# Patient Record
Sex: MALE | Race: WHITE | URBAN - METROPOLITAN AREA
[De-identification: names, ages, dates, MRNs, and addresses within clinical notes are randomized per-mention and may not be internally consistent; named-entity substitution may affect disease eponyms.]

---

## 2019-12-21 ENCOUNTER — HISTORICAL (OUTPATIENT)
Dept: ADMINISTRATIVE | Facility: HOSPITAL | Age: 24
End: 2019-12-21

## 2019-12-21 LAB
ABS NEUT (OLG): 15.42 X10(3)/MCL (ref 2.1–9.2)
ALBUMIN SERPL-MCNC: 3.1 GM/DL (ref 3.4–5)
ALBUMIN/GLOB SERPL: 0.9 {RATIO}
ALP SERPL-CCNC: 83 UNIT/L (ref 50–136)
ALT SERPL-CCNC: 114 UNIT/L (ref 12–78)
AMPHET UR QL SCN: POSITIVE
AMYLASE SERPL-CCNC: 67 UNIT/L (ref 25–115)
ANION GAP SERPL CALC-SCNC: 15 MMOL/L
APPEARANCE, UA: CLEAR
APTT PPP: 35.3 SECOND(S) (ref 24.2–33.9)
AST SERPL-CCNC: 150 UNIT/L (ref 15–37)
BACTERIA SPEC CULT: ABNORMAL /HPF
BARBITURATE SCN PRESENT UR: NEGATIVE
BASOPHILS # BLD AUTO: 0.1 X10(3)/MCL (ref 0–0.2)
BASOPHILS NFR BLD AUTO: 0 %
BENZODIAZ UR QL SCN: NEGATIVE
BILIRUB SERPL-MCNC: 0.3 MG/DL (ref 0.2–1)
BILIRUB UR QL STRIP: NEGATIVE
BILIRUBIN DIRECT+TOT PNL SERPL-MCNC: 0.1 MG/DL (ref 0–0.2)
BILIRUBIN DIRECT+TOT PNL SERPL-MCNC: 0.2 MG/DL (ref 0–0.8)
BUN SERPL-MCNC: 17 MG/DL (ref 7–18)
BUN SERPL-MCNC: 17 MG/DL (ref 8–26)
CALCIUM SERPL-MCNC: 7.6 MG/DL (ref 8.5–10.1)
CANNABINOIDS UR QL SCN: NEGATIVE
CHLORIDE SERPL-SCNC: 105 MMOL/L (ref 98–109)
CHLORIDE SERPL-SCNC: 108 MMOL/L (ref 98–107)
CO2 SERPL-SCNC: 22 MMOL/L (ref 21–32)
COCAINE UR QL SCN: NEGATIVE
COLOR UR: YELLOW
CREAT SERPL-MCNC: 1.21 MG/DL (ref 0.7–1.3)
CREAT SERPL-MCNC: 1.3 MG/DL (ref 0.6–1.3)
CROSSMATCH INTERPRETATION: NORMAL
EOSINOPHIL # BLD AUTO: 0.1 X10(3)/MCL (ref 0–0.9)
EOSINOPHIL NFR BLD AUTO: 1 %
ERYTHROCYTE [DISTWIDTH] IN BLOOD BY AUTOMATED COUNT: 12.1 % (ref 11.5–17)
ETHANOL SERPL-MCNC: 70 MG/DL (ref 0–3)
GLOBULIN SER-MCNC: 3.5 GM/DL (ref 2.4–3.5)
GLUCOSE (UA): NEGATIVE
GLUCOSE SERPL-MCNC: 164 MG/DL (ref 74–106)
GLUCOSE SERPL-MCNC: 166 MG/DL (ref 70–105)
GROUP & RH: NORMAL
HCT VFR BLD AUTO: 41.1 % (ref 42–52)
HCT VFR BLD CALC: 39 % (ref 38–51)
HGB BLD-MCNC: 13.3 MG/DL (ref 12–17)
HGB BLD-MCNC: 13.4 GM/DL (ref 14–18)
HGB UR QL STRIP: ABNORMAL
INR PPP: 1.3 (ref 0–1.3)
KETONES UR QL STRIP: NEGATIVE
LACTATE SERPL-SCNC: 1.7 MMOL/L (ref 0.4–2)
LACTATE SERPL-SCNC: 1.8 MMOL/L (ref 0.4–2)
LACTATE SERPL-SCNC: 2.2 MMOL/L (ref 0.4–2)
LACTATE SERPL-SCNC: 3.3 MMOL/L (ref 0.4–2)
LEUKOCYTE ESTERASE UR QL STRIP: NEGATIVE
LIPASE SERPL-CCNC: 168 UNIT/L (ref 73–393)
LYMPHOCYTES # BLD AUTO: 2.6 X10(3)/MCL (ref 0.6–4.6)
LYMPHOCYTES NFR BLD AUTO: 14 %
MCH RBC QN AUTO: 29.9 PG (ref 27–31)
MCHC RBC AUTO-ENTMCNC: 32.6 GM/DL (ref 33–36)
MCV RBC AUTO: 91.7 FL (ref 80–94)
MONOCYTES # BLD AUTO: 0.6 X10(3)/MCL (ref 0.1–1.3)
MONOCYTES NFR BLD AUTO: 3 %
NEUTROPHILS # BLD AUTO: 15.42 X10(3)/MCL (ref 2.1–9.2)
NEUTROPHILS NFR BLD AUTO: 80 %
NITRITE UR QL STRIP: NEGATIVE
OPIATES UR QL SCN: NEGATIVE
PCP UR QL: NEGATIVE
PH UR STRIP.AUTO: 7 [PH] (ref 5–7.5)
PH UR STRIP: 7 [PH] (ref 5–9)
PLATELET # BLD AUTO: 244 X10(3)/MCL (ref 130–400)
PMV BLD AUTO: 9.4 FL (ref 9.4–12.4)
POC IONIZED CALCIUM: 1.05 MMOL/L (ref 1.12–1.32)
POC TCO2: 23 MMOL/L (ref 24–29)
POTASSIUM BLD-SCNC: 3.1 MMOL/L (ref 3.5–4.9)
POTASSIUM SERPL-SCNC: 3.2 MMOL/L (ref 3.5–5.1)
PRODUCT READY: NORMAL
PROT SERPL-MCNC: 6.6 GM/DL (ref 6.4–8.2)
PROT UR QL STRIP: ABNORMAL
PROTHROMBIN TIME: 16.2 SECOND(S) (ref 12–14)
RBC # BLD AUTO: 4.48 X10(6)/MCL (ref 4.7–6.1)
RBC #/AREA URNS HPF: 21 /HPF (ref 0–2)
SODIUM BLD-SCNC: 139 MMOL/L (ref 138–146)
SODIUM SERPL-SCNC: 138 MMOL/L (ref 136–145)
SP GR FLD REFRACTOMETRY: 1.01 (ref 1–1.03)
SP GR UR STRIP: 1.01 (ref 1–1.03)
SQUAMOUS EPITHELIAL, UA: ABNORMAL
UROBILINOGEN UR STRIP-ACNC: 0.2
WBC # SPEC AUTO: 19.2 X10(3)/MCL (ref 4.5–11.5)
WBC #/AREA URNS HPF: 14 /HPF (ref 0–3)

## 2019-12-22 LAB
ABS NEUT (OLG): 7.78 X10(3)/MCL (ref 2.1–9.2)
ALBUMIN SERPL-MCNC: 2.3 GM/DL (ref 3.4–5)
ALBUMIN/GLOB SERPL: 0.9 RATIO (ref 1.1–2)
ALP SERPL-CCNC: 57 UNIT/L (ref 50–136)
ALT SERPL-CCNC: 70 UNIT/L (ref 12–78)
APTT PPP: 37.1 SECOND(S) (ref 24.2–33.9)
AST SERPL-CCNC: 75 UNIT/L (ref 15–37)
BASOPHILS # BLD AUTO: 0.1 X10(3)/MCL (ref 0–0.2)
BASOPHILS NFR BLD AUTO: 0 %
BILIRUB SERPL-MCNC: 0.9 MG/DL (ref 0.2–1)
BILIRUBIN DIRECT+TOT PNL SERPL-MCNC: 0.2 MG/DL (ref 0–0.5)
BILIRUBIN DIRECT+TOT PNL SERPL-MCNC: 0.7 MG/DL (ref 0–0.8)
BUN SERPL-MCNC: 14 MG/DL (ref 7–18)
CALCIUM SERPL-MCNC: 7.4 MG/DL (ref 8.5–10.1)
CHLORIDE SERPL-SCNC: 107 MMOL/L (ref 98–107)
CO2 SERPL-SCNC: 28 MMOL/L (ref 21–32)
CREAT SERPL-MCNC: 0.84 MG/DL (ref 0.7–1.3)
EOSINOPHIL # BLD AUTO: 0.3 X10(3)/MCL (ref 0–0.9)
EOSINOPHIL NFR BLD AUTO: 2 %
ERYTHROCYTE [DISTWIDTH] IN BLOOD BY AUTOMATED COUNT: 12.8 % (ref 11.5–17)
GLOBULIN SER-MCNC: 2.6 GM/DL (ref 2.4–3.5)
GLUCOSE SERPL-MCNC: 77 MG/DL (ref 74–106)
HCT VFR BLD AUTO: 33 % (ref 42–52)
HGB BLD-MCNC: 10.9 GM/DL (ref 14–18)
INR PPP: 1.2 (ref 0–1.3)
LYMPHOCYTES # BLD AUTO: 2.2 X10(3)/MCL (ref 0.6–4.6)
LYMPHOCYTES NFR BLD AUTO: 20 %
MCH RBC QN AUTO: 30.4 PG (ref 27–31)
MCHC RBC AUTO-ENTMCNC: 33 GM/DL (ref 33–36)
MCV RBC AUTO: 92.2 FL (ref 80–94)
MONOCYTES # BLD AUTO: 0.6 X10(3)/MCL (ref 0.1–1.3)
MONOCYTES NFR BLD AUTO: 5 %
NEUTROPHILS # BLD AUTO: 7.78 X10(3)/MCL (ref 2.1–9.2)
NEUTROPHILS NFR BLD AUTO: 71 %
PLATELET # BLD AUTO: 131 X10(3)/MCL (ref 130–400)
PMV BLD AUTO: 10 FL (ref 9.4–12.4)
POTASSIUM SERPL-SCNC: 4 MMOL/L (ref 3.5–5.1)
PROT SERPL-MCNC: 4.9 GM/DL (ref 6.4–8.2)
PROTHROMBIN TIME: 15.2 SECOND(S) (ref 12–14)
RBC # BLD AUTO: 3.58 X10(6)/MCL (ref 4.7–6.1)
SODIUM SERPL-SCNC: 141 MMOL/L (ref 136–145)
WBC # SPEC AUTO: 11 X10(3)/MCL (ref 4.5–11.5)

## 2019-12-24 LAB
ABS NEUT (OLG): 5.23 X10(3)/MCL (ref 2.1–9.2)
ALBUMIN SERPL-MCNC: 2.5 GM/DL (ref 3.4–5)
ALBUMIN/GLOB SERPL: 0.8 {RATIO}
ALP SERPL-CCNC: 65 UNIT/L (ref 50–136)
ALT SERPL-CCNC: 62 UNIT/L (ref 12–78)
AST SERPL-CCNC: 61 UNIT/L (ref 15–37)
BASOPHILS # BLD AUTO: 0 X10(3)/MCL (ref 0–0.2)
BASOPHILS NFR BLD AUTO: 0 %
BILIRUB SERPL-MCNC: 0.7 MG/DL (ref 0.2–1)
BILIRUBIN DIRECT+TOT PNL SERPL-MCNC: 0.2 MG/DL (ref 0–0.2)
BILIRUBIN DIRECT+TOT PNL SERPL-MCNC: 0.5 MG/DL (ref 0–0.8)
BUN SERPL-MCNC: 14 MG/DL (ref 7–18)
CALCIUM SERPL-MCNC: 8.2 MG/DL (ref 8.5–10.1)
CHLORIDE SERPL-SCNC: 103 MMOL/L (ref 98–107)
CO2 SERPL-SCNC: 28 MMOL/L (ref 21–32)
CREAT SERPL-MCNC: 0.76 MG/DL (ref 0.7–1.3)
EOSINOPHIL # BLD AUTO: 0.5 X10(3)/MCL (ref 0–0.9)
EOSINOPHIL NFR BLD AUTO: 6 %
ERYTHROCYTE [DISTWIDTH] IN BLOOD BY AUTOMATED COUNT: 12.7 % (ref 11.5–17)
GLOBULIN SER-MCNC: 3 GM/DL (ref 2.4–3.5)
GLUCOSE SERPL-MCNC: 96 MG/DL (ref 74–106)
HCT VFR BLD AUTO: 32.3 % (ref 42–52)
HGB BLD-MCNC: 10.7 GM/DL (ref 14–18)
LYMPHOCYTES # BLD AUTO: 1.4 X10(3)/MCL (ref 0.6–4.6)
LYMPHOCYTES NFR BLD AUTO: 19 %
MCH RBC QN AUTO: 31.2 PG (ref 27–31)
MCHC RBC AUTO-ENTMCNC: 33.1 GM/DL (ref 33–36)
MCV RBC AUTO: 94.2 FL (ref 80–94)
MONOCYTES # BLD AUTO: 0.5 X10(3)/MCL (ref 0.1–1.3)
MONOCYTES NFR BLD AUTO: 6 %
NEUTROPHILS # BLD AUTO: 5.23 X10(3)/MCL (ref 2.1–9.2)
NEUTROPHILS NFR BLD AUTO: 68 %
PLATELET # BLD AUTO: 162 X10(3)/MCL (ref 130–400)
PMV BLD AUTO: 9.4 FL (ref 9.4–12.4)
POTASSIUM SERPL-SCNC: 3.9 MMOL/L (ref 3.5–5.1)
PROT SERPL-MCNC: 5.5 GM/DL (ref 6.4–8.2)
RBC # BLD AUTO: 3.43 X10(6)/MCL (ref 4.7–6.1)
SODIUM SERPL-SCNC: 138 MMOL/L (ref 136–145)
WBC # SPEC AUTO: 7.7 X10(3)/MCL (ref 4.5–11.5)

## 2019-12-25 LAB
ABS NEUT (OLG): 4.76 X10(3)/MCL (ref 2.1–9.2)
BASOPHILS # BLD AUTO: 0 X10(3)/MCL (ref 0–0.2)
BASOPHILS NFR BLD AUTO: 0 %
EOSINOPHIL # BLD AUTO: 0.4 X10(3)/MCL (ref 0–0.9)
EOSINOPHIL NFR BLD AUTO: 6 %
ERYTHROCYTE [DISTWIDTH] IN BLOOD BY AUTOMATED COUNT: 12.4 % (ref 11.5–17)
HCT VFR BLD AUTO: 35.5 % (ref 42–52)
HGB BLD-MCNC: 11.7 GM/DL (ref 14–18)
LYMPHOCYTES # BLD AUTO: 1.2 X10(3)/MCL (ref 0.6–4.6)
LYMPHOCYTES NFR BLD AUTO: 17 %
MCH RBC QN AUTO: 30.6 PG (ref 27–31)
MCHC RBC AUTO-ENTMCNC: 33 GM/DL (ref 33–36)
MCV RBC AUTO: 92.9 FL (ref 80–94)
MONOCYTES # BLD AUTO: 0.5 X10(3)/MCL (ref 0.1–1.3)
MONOCYTES NFR BLD AUTO: 7 %
NEUTROPHILS # BLD AUTO: 4.76 X10(3)/MCL (ref 2.1–9.2)
NEUTROPHILS NFR BLD AUTO: 68 %
PLATELET # BLD AUTO: 181 X10(3)/MCL (ref 130–400)
PMV BLD AUTO: 8.9 FL (ref 9.4–12.4)
RBC # BLD AUTO: 3.82 X10(6)/MCL (ref 4.7–6.1)
WBC # SPEC AUTO: 7 X10(3)/MCL (ref 4.5–11.5)

## 2020-01-06 ENCOUNTER — HISTORICAL (OUTPATIENT)
Dept: ADMINISTRATIVE | Facility: HOSPITAL | Age: 25
End: 2020-01-06

## 2020-01-28 ENCOUNTER — HISTORICAL (OUTPATIENT)
Dept: ADMINISTRATIVE | Facility: HOSPITAL | Age: 25
End: 2020-01-28

## 2022-04-07 ENCOUNTER — HISTORICAL (OUTPATIENT)
Dept: ADMINISTRATIVE | Facility: HOSPITAL | Age: 27
End: 2022-04-07

## 2022-04-23 VITALS
HEIGHT: 71 IN | BODY MASS INDEX: 24.01 KG/M2 | DIASTOLIC BLOOD PRESSURE: 72 MMHG | SYSTOLIC BLOOD PRESSURE: 114 MMHG | WEIGHT: 171.5 LBS
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
